# Patient Record
Sex: MALE | Race: BLACK OR AFRICAN AMERICAN | NOT HISPANIC OR LATINO | Employment: STUDENT | URBAN - METROPOLITAN AREA
[De-identification: names, ages, dates, MRNs, and addresses within clinical notes are randomized per-mention and may not be internally consistent; named-entity substitution may affect disease eponyms.]

---
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
## 2024-02-06 ENCOUNTER — HOSPITAL ENCOUNTER (EMERGENCY)
Facility: HOSPITAL | Age: 24
Discharge: HOME | End: 2024-02-06
Attending: EMERGENCY MEDICINE | Admitting: EMERGENCY MEDICINE
Payer: COMMERCIAL

## 2024-02-06 VITALS
HEIGHT: 75 IN | OXYGEN SATURATION: 98 % | DIASTOLIC BLOOD PRESSURE: 91 MMHG | HEART RATE: 87 BPM | SYSTOLIC BLOOD PRESSURE: 142 MMHG | RESPIRATION RATE: 16 BRPM | WEIGHT: 195 LBS | TEMPERATURE: 97.8 F | BODY MASS INDEX: 24.25 KG/M2

## 2024-02-06 DIAGNOSIS — J02.9 PHARYNGITIS, UNSPECIFIED ETIOLOGY: Primary | ICD-10-CM

## 2024-02-06 LAB
FLUAV SUBTYP SPEC NAA+PROBE: NOT DETECTED
FLUBV RNA ISLT QL NAA+PROBE: NOT DETECTED
S PYO AG THROAT QL: NEGATIVE
SARS-COV-2 RNA RESP QL NAA+PROBE: NOT DETECTED

## 2024-02-06 PROCEDURE — 99283 EMERGENCY DEPT VISIT LOW MDM: CPT

## 2024-02-06 PROCEDURE — 87636 SARSCOV2 & INF A&B AMP PRB: CPT | Performed by: PHYSICIAN ASSISTANT

## 2024-02-06 PROCEDURE — 87880 STREP A ASSAY W/OPTIC: CPT | Performed by: PHYSICIAN ASSISTANT

## 2024-02-06 PROCEDURE — 87081 CULTURE SCREEN ONLY: CPT | Performed by: PHYSICIAN ASSISTANT

## 2024-02-06 RX ORDER — AMOXICILLIN 500 MG/1
1000 CAPSULE ORAL 3 TIMES DAILY
Qty: 60 CAPSULE | Refills: 0 | Status: SHIPPED | OUTPATIENT
Start: 2024-02-06 | End: 2024-02-16

## 2024-02-06 NOTE — Clinical Note
[facility] EMERGENCY DEPARTMENT  [address]  [facility] [address]  Phone: [phone]    Barber Estrella was seen and treated in our emergency department on 2/6/2024.  He may return to school on 02/08/2024.          Thank you for choosing [facility].    Jennifer Moss, PA

## 2024-02-06 NOTE — ED PROVIDER NOTES
Subjective   History of Present Illness  Pt is a 24 yo male presenting to ED with complaints of sore throat. Pt denies significant past medical hx. Pt reports sore throat and fever for the past week. She denies congestion, headache, cough, SOB, N/V/D or abdominal pain. Patient has been exposed to strep. No tobacco, drug or ETOH use.     History provided by:  Patient and medical records      Review of Systems   Constitutional:  Negative for fever.   HENT:  Positive for sore throat. Negative for congestion and trouble swallowing.    Eyes:  Negative for visual disturbance.   Respiratory:  Negative for cough and shortness of breath.    Cardiovascular:  Negative for chest pain.   Gastrointestinal:  Negative for abdominal pain, diarrhea, nausea and vomiting.   Neurological:  Negative for dizziness and headaches.       No past medical history on file.    No Known Allergies    No past surgical history on file.    No family history on file.    Social History     Socioeconomic History    Marital status: Single   Tobacco Use    Smoking status: Never           Objective   Physical Exam  Vitals and nursing note reviewed.   Constitutional:       General: He is not in acute distress.  HENT:      Head: Atraumatic.      Nose: No congestion.      Mouth/Throat:      Pharynx: Uvula midline. Pharyngeal swelling and posterior oropharyngeal erythema present. No uvula swelling.      Tonsils: Tonsillar exudate present. No tonsillar abscesses. 1+ on the right. 1+ on the left.   Eyes:      Conjunctiva/sclera: Conjunctivae normal.      Pupils: Pupils are equal, round, and reactive to light.   Cardiovascular:      Rate and Rhythm: Normal rate.   Pulmonary:      Effort: Pulmonary effort is normal. No respiratory distress.   Skin:     General: Skin is warm.   Neurological:      Mental Status: He is alert.   Psychiatric:         Mood and Affect: Mood normal.         Behavior: Behavior normal.         Procedures           ED Course      Recent  "Results (from the past 24 hour(s))   Rapid Strep A Screen - Swab, Throat    Collection Time: 02/06/24 12:18 PM    Specimen: Throat; Swab   Result Value Ref Range    Strep A Ag Negative Negative   COVID-19 and FLU A/B PCR, 1 HR TAT - Swab, Nasopharynx    Collection Time: 02/06/24 12:18 PM    Specimen: Nasopharynx; Swab   Result Value Ref Range    COVID19 Not Detected Not Detected - Ref. Range    Influenza A PCR Not Detected Not Detected    Influenza B PCR Not Detected Not Detected     Note: In addition to lab results from this visit, the labs listed above may include labs taken at another facility or during a different encounter within the last 24 hours. Please correlate lab times with ED admission and discharge times for further clarification of the services performed during this visit.    No orders to display     Vitals:    02/06/24 1200   BP: 142/91   Pulse: 87   Resp: 16   Temp: 97.8 °F (36.6 °C)   SpO2: 98%   Weight: 88.5 kg (195 lb)   Height: 190.5 cm (75\")     Medications - No data to display  ECG/EMG Results (last 24 hours)       ** No results found for the last 24 hours. **          No orders to display                                              Medical Decision Making  Pt is a 24 yo male presenting to ED with complaints of sore throat and fever for the past week. Exam concerning for tonsillitis and strep.. Initial rapid negative but will culture and cover with Amoxicillin. Negative Covid and Flu. Went over symptomatic tx and new / worse sx to return to ED.     DDx  Covid, Flu, Strep, Tonsillar abscess, Mono    Problems Addressed:  Pharyngitis, unspecified etiology: complicated acute illness or injury    Amount and/or Complexity of Data Reviewed  External Data Reviewed: notes.     Details: Reviewed previous non ED visits including prior labs, imaging, available notes, medications, allergies and surgical hx.     Labs:  Decision-making details documented in ED Course.    Risk  Prescription drug " management.        Final diagnoses:   Pharyngitis, unspecified etiology       ED Disposition  ED Disposition       ED Disposition   Discharge    Condition   Stable    Comment   --               PATIENT CONNECTION - [place] [address]  [phone]    Call and establish a primary care doctor.    [facility] EMERGENCY DEPARTMENT  [address]  [facility] [id number] [phone]    If symptoms worsen         Medication List        New Prescriptions      amoxicillin 500 MG capsule  Commonly known as: AMOXIL  Take 2 capsules by mouth 3 (Three) Times a Day for 10 days.               Where to Get Your Medications        These medications were sent to [facility] [id number] - [place], [place] - [facility] [address] AT [facility] [address] - [phone]  - [phone] [place] [address] [address], [facility] [address]      Phone: [phone]   amoxicillin 500 MG capsule            Jennifer Moss PA  02/06/24 0461

## 2024-02-08 LAB — BACTERIA SPEC AEROBE CULT: NORMAL

## 2024-02-21 ENCOUNTER — APPOINTMENT (OUTPATIENT)
Dept: CT IMAGING | Facility: HOSPITAL | Age: 24
End: 2024-02-21
Payer: COMMERCIAL

## 2024-02-21 ENCOUNTER — HOSPITAL ENCOUNTER (EMERGENCY)
Facility: HOSPITAL | Age: 24
Discharge: HOME | End: 2024-02-21
Attending: EMERGENCY MEDICINE | Admitting: EMERGENCY MEDICINE
Payer: COMMERCIAL

## 2024-02-21 VITALS
WEIGHT: 195 LBS | DIASTOLIC BLOOD PRESSURE: 89 MMHG | BODY MASS INDEX: 24.25 KG/M2 | RESPIRATION RATE: 18 BRPM | HEART RATE: 63 BPM | SYSTOLIC BLOOD PRESSURE: 145 MMHG | OXYGEN SATURATION: 100 % | TEMPERATURE: 99.2 F | HEIGHT: 75 IN

## 2024-02-21 DIAGNOSIS — N20.0 NEPHROLITHIASIS: ICD-10-CM

## 2024-02-21 DIAGNOSIS — D72.829 LEUKOCYTOSIS, UNSPECIFIED TYPE: ICD-10-CM

## 2024-02-21 DIAGNOSIS — R10.9 ACUTE RIGHT FLANK PAIN: Primary | ICD-10-CM

## 2024-02-21 DIAGNOSIS — N13.4 HYDROURETER: ICD-10-CM

## 2024-02-21 DIAGNOSIS — R31.21 ASYMPTOMATIC MICROSCOPIC HEMATURIA: ICD-10-CM

## 2024-02-21 LAB
ALBUMIN SERPL-MCNC: 4.7 G/DL (ref 3.5–5.2)
ALBUMIN/GLOB SERPL: 1.2 G/DL
ALP SERPL-CCNC: 69 U/L (ref 39–117)
ALT SERPL W P-5'-P-CCNC: 158 U/L (ref 1–41)
ANION GAP SERPL CALCULATED.3IONS-SCNC: 15 MMOL/L (ref 5–15)
AST SERPL-CCNC: 86 U/L (ref 1–40)
BACTERIA UR QL AUTO: ABNORMAL /HPF
BASOPHILS # BLD AUTO: 0.08 10*3/MM3 (ref 0–0.2)
BASOPHILS NFR BLD AUTO: 0.6 % (ref 0–1.5)
BILIRUB SERPL-MCNC: 0.3 MG/DL (ref 0–1.2)
BILIRUB UR QL STRIP: NEGATIVE
BUN SERPL-MCNC: 16 MG/DL (ref 6–20)
BUN/CREAT SERPL: 15.8 (ref 7–25)
CALCIUM SPEC-SCNC: 9.5 MG/DL (ref 8.6–10.5)
CHLORIDE SERPL-SCNC: 101 MMOL/L (ref 98–107)
CLARITY UR: ABNORMAL
CO2 SERPL-SCNC: 26 MMOL/L (ref 22–29)
COLOR UR: YELLOW
CREAT SERPL-MCNC: 1.01 MG/DL (ref 0.76–1.27)
DEPRECATED RDW RBC AUTO: 40.2 FL (ref 37–54)
EGFRCR SERPLBLD CKD-EPI 2021: 107.8 ML/MIN/1.73
EOSINOPHIL # BLD AUTO: 0.21 10*3/MM3 (ref 0–0.4)
EOSINOPHIL NFR BLD AUTO: 1.5 % (ref 0.3–6.2)
ERYTHROCYTE [DISTWIDTH] IN BLOOD BY AUTOMATED COUNT: 13.6 % (ref 12.3–15.4)
GLOBULIN UR ELPH-MCNC: 3.8 GM/DL
GLUCOSE SERPL-MCNC: 122 MG/DL (ref 65–99)
GLUCOSE UR STRIP-MCNC: NEGATIVE MG/DL
HCT VFR BLD AUTO: 46.7 % (ref 37.5–51)
HGB BLD-MCNC: 14.3 G/DL (ref 13–17.7)
HGB UR QL STRIP.AUTO: ABNORMAL
HYALINE CASTS UR QL AUTO: ABNORMAL /LPF
IMM GRANULOCYTES # BLD AUTO: 0.06 10*3/MM3 (ref 0–0.05)
IMM GRANULOCYTES NFR BLD AUTO: 0.4 % (ref 0–0.5)
KETONES UR QL STRIP: ABNORMAL
LEUKOCYTE ESTERASE UR QL STRIP.AUTO: NEGATIVE
LIPASE SERPL-CCNC: 11 U/L (ref 13–60)
LYMPHOCYTES # BLD AUTO: 2.24 10*3/MM3 (ref 0.7–3.1)
LYMPHOCYTES NFR BLD AUTO: 15.5 % (ref 19.6–45.3)
MCH RBC QN AUTO: 25.1 PG (ref 26.6–33)
MCHC RBC AUTO-ENTMCNC: 30.6 G/DL (ref 31.5–35.7)
MCV RBC AUTO: 81.9 FL (ref 79–97)
MONOCYTES # BLD AUTO: 0.91 10*3/MM3 (ref 0.1–0.9)
MONOCYTES NFR BLD AUTO: 6.3 % (ref 5–12)
NEUTROPHILS NFR BLD AUTO: 10.95 10*3/MM3 (ref 1.7–7)
NEUTROPHILS NFR BLD AUTO: 75.7 % (ref 42.7–76)
NITRITE UR QL STRIP: NEGATIVE
NRBC BLD AUTO-RTO: 0 /100 WBC (ref 0–0.2)
PH UR STRIP.AUTO: 6 [PH] (ref 5–8)
PLATELET # BLD AUTO: 295 10*3/MM3 (ref 140–450)
PMV BLD AUTO: 10.8 FL (ref 6–12)
POTASSIUM SERPL-SCNC: 4.1 MMOL/L (ref 3.5–5.2)
PROT SERPL-MCNC: 8.5 G/DL (ref 6–8.5)
PROT UR QL STRIP: ABNORMAL
RBC # BLD AUTO: 5.7 10*6/MM3 (ref 4.14–5.8)
RBC # UR STRIP: ABNORMAL /HPF
REF LAB TEST METHOD: ABNORMAL
SODIUM SERPL-SCNC: 142 MMOL/L (ref 136–145)
SP GR UR STRIP: 1.02 (ref 1–1.03)
SQUAMOUS #/AREA URNS HPF: ABNORMAL /HPF
UROBILINOGEN UR QL STRIP: ABNORMAL
WBC # UR STRIP: ABNORMAL /HPF
WBC NRBC COR # BLD AUTO: 14.45 10*3/MM3 (ref 3.4–10.8)

## 2024-02-21 PROCEDURE — 99284 EMERGENCY DEPT VISIT MOD MDM: CPT

## 2024-02-21 PROCEDURE — 85025 COMPLETE CBC W/AUTO DIFF WBC: CPT | Performed by: EMERGENCY MEDICINE

## 2024-02-21 PROCEDURE — 74176 CT ABD & PELVIS W/O CONTRAST: CPT

## 2024-02-21 PROCEDURE — 83690 ASSAY OF LIPASE: CPT | Performed by: EMERGENCY MEDICINE

## 2024-02-21 PROCEDURE — 96360 HYDRATION IV INFUSION INIT: CPT

## 2024-02-21 PROCEDURE — 25810000003 SODIUM CHLORIDE 0.9 % SOLUTION: Performed by: EMERGENCY MEDICINE

## 2024-02-21 PROCEDURE — 81001 URINALYSIS AUTO W/SCOPE: CPT | Performed by: EMERGENCY MEDICINE

## 2024-02-21 PROCEDURE — 80053 COMPREHEN METABOLIC PANEL: CPT | Performed by: EMERGENCY MEDICINE

## 2024-02-21 RX ORDER — ONDANSETRON 4 MG/1
4 TABLET, FILM COATED ORAL EVERY 6 HOURS PRN
Qty: 15 TABLET | Refills: 0 | Status: SHIPPED | OUTPATIENT
Start: 2024-02-21

## 2024-02-21 RX ADMIN — SODIUM CHLORIDE 1000 ML: 9 INJECTION, SOLUTION INTRAVENOUS at 08:53

## 2024-02-21 NOTE — ED PROVIDER NOTES
"Subjective   History of Present Illness  This patient is a very nice 23-year-old gentleman who comes in with flank pain and low back pain over the last couple of weeks.  Triage note indicated the patient has had left-sided flank pain for 2 weeks.  Patient corrected this initial note and indicated that he has had right-sided lower back pain and right-sided flank pain over the last 3 or 4 weeks.  He was seen here few weeks ago for strept throat and tells me that his back pain was present at that time.  He initially thought that the pain was related.  He comes in with mom and dad who confirm the aforementioned story.  Patient is afebrile here at 98.3, nontachycardic with a heart rate of 54 with oxygen saturation of 9 9% on room air.  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  He does not do any heavy lifting.  He has had no difficulty with bowel or bladder function.  He denies any urinary retention.  Denies any urinary frequency or hematuria.  Denies any history of trauma to the back, disc disease, or muscle strains.  He denies any difficulty with urination.  Denies any history of ureterolithiasis.  He tells me the pain has been \"coming and going over the last 3 or 4 weeks.\"  He tells me that nothing specifically makes it better or worse.  He had an episode today that was more severe than usual and he decided to come in for evaluation.  He comes in with his mom and dad.  They tell me the patient is at baseline with regard to mental status.  Patient is pleasant and in no acute distress very articulate and very polite.  Patient denies any chest pain.  Denies any constipation.  Denies any past medical history.    In summary, we have a very nice 23-year-old gentleman who has had right-sided flank pain over the last couple of weeks since a diagnosis of strep and comes in today because the pain is still present in a waxing and waning fashion and actually got somewhat worse last night and today.  Nothing " makes it better, nothing makes it worse.    Past medical history  Negative hypertension, abdominal surgery, dyslipidemia, CAD or CVA.    Family history  Negative for IBS, Crohn's disease      Review of Systems   Constitutional: Negative.  Negative for chills, fatigue, fever and unexpected weight change.   HENT:  Negative for dental problem, ear pain, hearing loss and sinus pressure.    Eyes:  Negative for pain and visual disturbance.   Respiratory:  Negative for chest tightness and shortness of breath.    Cardiovascular:  Negative for chest pain, palpitations and leg swelling.   Gastrointestinal:  Negative for blood in stool, diarrhea, nausea and vomiting.   Genitourinary:  Negative for difficulty urinating, dysuria, frequency, hematuria and urgency.   Musculoskeletal:  Positive for back pain. Negative for myalgias, neck pain and neck stiffness.        Right lower back pain and right flank pain.   Neurological:  Negative for seizures, syncope, speech difficulty, light-headedness and headaches.   Psychiatric/Behavioral:  Negative for confusion.    All other systems reviewed and are negative.      History reviewed. No pertinent past medical history.    No Known Allergies    Past Surgical History:   Procedure Laterality Date    APPENDECTOMY         History reviewed. No pertinent family history.    Social History     Socioeconomic History    Marital status: Single   Tobacco Use    Smoking status: Never   Substance and Sexual Activity    Drug use: Never    Sexual activity: Yes           Objective   Physical Exam  Vitals and nursing note reviewed.   Constitutional:       General: He is not in acute distress.     Appearance: He is well-developed. He is not toxic-appearing.   HENT:      Head: Normocephalic and atraumatic.      Jaw: No trismus.      Right Ear: Ear canal and external ear normal.      Left Ear: Ear canal and external ear normal.      Nose: Nose normal.      Mouth/Throat:      Mouth: Mucous membranes are moist.  Mucous membranes are not dry. No oral lesions.      Dentition: No dental abscesses.      Pharynx: Oropharynx is clear. No posterior oropharyngeal erythema or uvula swelling.      Tonsils: No tonsillar exudate or tonsillar abscesses.   Eyes:      General:         Right eye: No discharge.         Left eye: No discharge.      Extraocular Movements: Extraocular movements intact.      Conjunctiva/sclera: Conjunctivae normal.      Right eye: Right conjunctiva is not injected.      Left eye: Left conjunctiva is not injected.      Pupils: Pupils are equal, round, and reactive to light.   Neck:      Vascular: No JVD.      Trachea: No tracheal tenderness.      Comments: No C, T, or L-spine bony midline tenderness palpation.  No step-offs or deformities.  Mild right-sided paraspinal musculature discomfort to palpation.  Cardiovascular:      Rate and Rhythm: Normal rate and regular rhythm.      Heart sounds: Normal heart sounds.      No friction rub. No gallop.   Pulmonary:      Effort: Pulmonary effort is normal.      Breath sounds: Normal breath sounds. No wheezing or rales.   Chest:      Chest wall: No tenderness.   Abdominal:      General: Bowel sounds are normal. There is no distension.      Palpations: Abdomen is soft. Abdomen is not rigid. There is no mass or pulsatile mass.      Tenderness: There is no abdominal tenderness. There is right CVA tenderness. There is no left CVA tenderness, guarding or rebound. Negative signs include McBurney's sign.      Comments: No signs of acute abdomen.  No pain at McBurney's point.  No pulsatile abdominal mass.   Musculoskeletal:         General: No tenderness or deformity. Normal range of motion.      Cervical back: Normal range of motion and neck supple. No rigidity. Normal range of motion.   Lymphadenopathy:      Cervical: No cervical adenopathy.   Skin:     General: Skin is warm and dry.      Capillary Refill: Capillary refill takes less than 2 seconds.      Findings: No erythema  or rash.      Comments: No diaphoresis, lesions, nevi, petechia, purpura   Neurological:      Mental Status: He is alert and oriented to person, place, and time.      Cranial Nerves: No cranial nerve deficit.      Sensory: No sensory deficit.      Motor: No weakness, tremor or abnormal muscle tone.      Deep Tendon Reflexes: Reflexes normal.      Comments: 5/5 strength bilaterally with flexion and extension of fingers, wrist, elbows, knees and hips as well as plantar and dorsiflexion of the foot.  Normal sensation to soft touch in the dermatomes of the lower extremities.  2+ deep tendon reflexes bilateral patellar tendon.  No perianal sensory loss per patient   Psychiatric:         Attention and Perception: He is attentive.         Speech: Speech normal.         Behavior: Behavior normal.         Thought Content: Thought content normal.         Judgment: Judgment normal.         Procedures           ED Course  ED Course as of 02/21/24 1555   Wed Feb 21, 2024   0838 I had a great conversation with the patient and his family which included mom and dad.  I discussed the differential diagnosis and medical decision making.  We have talked about muscle strain, disc disease, retroperitoneal hemorrhage, constipation, ureterolithiasis and other etiologies.  Given the history of 3 weeks of pain, I do not favor an emergent diagnosis that requires immediate intervention.  Will get a CT scan and labs.  Will get urinalysis and rehydrate the patient.  I do anticipate discharge home to follow-up as an outpatient which was very reassuring to the family.  Final impression and plan following completion of workup. [ANGELICA]   1002 Lipase is normal at 11.  Urinalysis shows trace protein, no leukocytes and no nitrites.  21-50 red cells 3-5 white blood cells with no bacteria.  CBC shows white blood cell count elevation at 14,000 with normal hemoglobin and hematocrit at 14.3 and 46.7.  Platelet count is 295.  CMP shows mild transaminase  elevation with AST and ALT elevated at 86 and 158 respectively.  Total bilirubin normal at 0.3 glucose 122.  Creatinine normal at 1.01. [ANGELICA]   1004 CT scan of the abdomen and pelvis was reviewed independently by me.  Evidence of hydroureter noted which was also noted by the radiologist on their official read.  No evidence of ureterolithiasis but small punctate right renal stone noted.  Official read has been cut/pasted below for completeness.    IMPRESSION:  Impression:  1.Trace right hydroureter which may be related to recent passage of stone although no ureteral nor bladder calculus identified currently.  2.There is a 1 mm nonobstructive punctate right intrarenal calculus.                 Electronically Signed: Sae Mehta MD    2/21/2024 9:15 AM EST    Workstation ID: [id number]   [ANGELICA]   1004 I updated the patient and family on the findings.  I was certainly happy with the finding a diagnosis that would fit the patient's symptoms over the last couple of weeks.  Will refer the patient to urology on-call physician today to follow-up as needed.  I have prescribed some nausea medication and instructed the patient to take Motrin and drink plenty of fluids.  Impression will include hematuria, hydroureter, nephrolithiasis.  Plan include mandatory follow-up with primary care.  Return immediately for new or changing concerns and drink plenty fluids.  Patient and family very appreciative for care and patient will be discharged home accordingly. [ANGELICA]   1017 Patient and family were very appreciative for care at the time of discharge.  They verbalized full understanding of the plan, the diagnoses, the medications and the follow-up.  We talked about the patient's white count, great vital signs, and the findings on CT scan including the small 1 mm right-sided nephrolithiasis.  Patient will be discharged home with a forementioned prescriptions, follow-up and referrals in place and I told him to come back at any point for new or  changing concerns or call me at any time for questions that did not arise here but that he comes up with when he goes home.  I struck his hand at the time of discharge and patient went home with mom and dad. [ANGELICA]      ED Course User Index  [ANGELICA] Brody Erickson MD                                 Recent Results (from the past 24 hour(s))   Comprehensive Metabolic Panel    Collection Time: 02/21/24  8:39 AM    Specimen: Blood   Result Value Ref Range    Glucose 122 (H) 65 - 99 mg/dL    BUN 16 6 - 20 mg/dL    Creatinine 1.01 0.76 - 1.27 mg/dL    Sodium 142 136 - 145 mmol/L    Potassium 4.1 3.5 - 5.2 mmol/L    Chloride 101 98 - 107 mmol/L    CO2 26.0 22.0 - 29.0 mmol/L    Calcium 9.5 8.6 - 10.5 mg/dL    Total Protein 8.5 6.0 - 8.5 g/dL    Albumin 4.7 3.5 - 5.2 g/dL    ALT (SGPT) 158 (H) 1 - 41 U/L    AST (SGOT) 86 (H) 1 - 40 U/L    Alkaline Phosphatase 69 39 - 117 U/L    Total Bilirubin 0.3 0.0 - 1.2 mg/dL    Globulin 3.8 gm/dL    A/G Ratio 1.2 g/dL    BUN/Creatinine Ratio 15.8 7.0 - 25.0    Anion Gap 15.0 5.0 - 15.0 mmol/L    eGFR 107.8 >60.0 mL/min/1.73   Lipase    Collection Time: 02/21/24  8:39 AM    Specimen: Blood   Result Value Ref Range    Lipase 11 (L) 13 - 60 U/L   CBC Auto Differential    Collection Time: 02/21/24  8:39 AM    Specimen: Blood   Result Value Ref Range    WBC 14.45 (H) 3.40 - 10.80 10*3/mm3    RBC 5.70 4.14 - 5.80 10*6/mm3    Hemoglobin 14.3 13.0 - 17.7 g/dL    Hematocrit 46.7 37.5 - 51.0 %    MCV 81.9 79.0 - 97.0 fL    MCH 25.1 (L) 26.6 - 33.0 pg    MCHC 30.6 (L) 31.5 - 35.7 g/dL    RDW 13.6 12.3 - 15.4 %    RDW-SD 40.2 37.0 - 54.0 fl    MPV 10.8 6.0 - 12.0 fL    Platelets 295 140 - 450 10*3/mm3    Neutrophil % 75.7 42.7 - 76.0 %    Lymphocyte % 15.5 (L) 19.6 - 45.3 %    Monocyte % 6.3 5.0 - 12.0 %    Eosinophil % 1.5 0.3 - 6.2 %    Basophil % 0.6 0.0 - 1.5 %    Immature Grans % 0.4 0.0 - 0.5 %    Neutrophils, Absolute 10.95 (H) 1.70 - 7.00 10*3/mm3    Lymphocytes, Absolute 2.24 0.70 - 3.10  10*3/mm3    Monocytes, Absolute 0.91 (H) 0.10 - 0.90 10*3/mm3    Eosinophils, Absolute 0.21 0.00 - 0.40 10*3/mm3    Basophils, Absolute 0.08 0.00 - 0.20 10*3/mm3    Immature Grans, Absolute 0.06 (H) 0.00 - 0.05 10*3/mm3    nRBC 0.0 0.0 - 0.2 /100 WBC   Urinalysis With Microscopic If Indicated (No Culture) - Urine, Clean Catch    Collection Time: 02/21/24  8:40 AM    Specimen: Urine, Clean Catch   Result Value Ref Range    Color, UA Yellow Yellow, Straw    Appearance, UA Turbid (A) Clear    pH, UA 6.0 5.0 - 8.0    Specific Gravity, UA 1.025 1.001 - 1.030    Glucose, UA Negative Negative    Ketones, UA Trace (A) Negative    Bilirubin, UA Negative Negative    Blood, UA Moderate (2+) (A) Negative    Protein, UA Trace (A) Negative    Leuk Esterase, UA Negative Negative    Nitrite, UA Negative Negative    Urobilinogen, UA 0.2 E.U./dL 0.2 - 1.0 E.U./dL   Urinalysis, Microscopic Only - Urine, Clean Catch    Collection Time: 02/21/24  8:40 AM    Specimen: Urine, Clean Catch   Result Value Ref Range    RBC, UA 21-50 (A) None Seen, 0-2 /HPF    WBC, UA 3-5 (A) None Seen, 0-2 /HPF    Bacteria, UA None Seen None Seen, Trace /HPF    Squamous Epithelial Cells, UA 0-2 None Seen, 0-2 /HPF    Hyaline Casts, UA 13-20 0 - 6 /LPF    Methodology Automated Microscopy      Note: In addition to lab results from this visit, the labs listed above may include labs taken at another facility or during a different encounter within the last 24 hours. Please correlate lab times with ED admission and discharge times for further clarification of the services performed during this visit.    CT Abdomen Pelvis Without Contrast   Final Result   Impression:   1.Trace right hydroureter which may be related to recent passage of stone although no ureteral nor bladder calculus identified currently.   2.There is a 1 mm nonobstructive punctate right intrarenal calculus.                  Electronically Signed: Sae Mehta MD     2/21/2024 9:15 AM EST      "Workstation ID: [id number]        Vitals:    02/21/24 0817 02/21/24 0830 02/21/24 0857   BP: 137/99 (!) 145/135 145/89   BP Location: Left arm Left arm Left arm   Patient Position: Sitting Sitting Sitting   Pulse: 54 67 63   Resp: 16 18 18   Temp: 98.3 °F (36.8 °C)  99.2 °F (37.3 °C)   TempSrc: Oral Oral Oral   SpO2: 99% 99% 100%   Weight: 88.5 kg (195 lb) 88.5 kg (195 lb)    Height: 190.5 cm (75\") 190.5 cm (75\")      Medications   sodium chloride 0.9 % bolus 1,000 mL (0 mL Intravenous Stopped 2/21/24 1024)     ECG/EMG Results (last 24 hours)       ** No results found for the last 24 hours. **          No orders to display                   Medical Decision Making  Certainly must consider retroperitoneal hemorrhage, intra-abdominal mass including of the kidneys.  Must consider ureterolithiasis, muscle strain/sprain, disc disease.  Will get CT imaging, labs and watch the patient closely.  I do not believe MRI is warranted at this time.  Patient has a normal neurologic exam, normal strength in lower extremities and normal 2+ deep tendon reflexes of patellar tendon.    Problems Addressed:  Acute right flank pain: complicated acute illness or injury  Asymptomatic microscopic hematuria: complicated acute illness or injury  Hydroureter: complicated acute illness or injury  Leukocytosis, unspecified type: complicated acute illness or injury  Nephrolithiasis: complicated acute illness or injury    Amount and/or Complexity of Data Reviewed  Labs: ordered. Decision-making details documented in ED Course.  Radiology: ordered. Decision-making details documented in ED Course.    Risk  Prescription drug management.        Final diagnoses:   Acute right flank pain   Asymptomatic microscopic hematuria   Hydroureter   Nephrolithiasis   Leukocytosis, unspecified type       ED Disposition  ED Disposition       ED Disposition   Discharge    Condition   Stable    Comment   --               Your PCP at the [facility]    In 1 " week      Chidi Llanos MD  [address]  [address]  [place] [address]  [phone]    In 1 week           Medication List        New Prescriptions      ondansetron 4 MG tablet  Commonly known as: ZOFRAN  Take 1 tablet by mouth Every 6 (Six) Hours As Needed for Nausea or Vomiting.               Where to Get Your Medications        These medications were sent to [facility] [id number] - [place], [place] - [address] [place] [address]  [facility] [place] [facility] - [phone]  - [phone] [address] , [facility] [address]      Phone: [phone]   ondansetron 4 MG tablet            Brody Erickson MD  02/21/24 0003

## 2024-02-21 NOTE — DISCHARGE INSTRUCTIONS
Drink plenty of fluids.  Take Zofran as prescribed and use Motrin as we discussed.    Follow-up with your PCP in the next week.  Call for an appointment as we discussed.    I have also referred you to our on-call urologist.  Call to make an appointment.    Return to the emergency department immediately for new or changing concerns.    Please review the medications you are supposed to be taking according to prior physician recommendations. I have not changed your home medications during this visit. If your discharge instructions indicate that I have changed your home medications, this is not the case, and you should disregard. If you have any questions about the medication you should be taking at home, please call your physician.